# Patient Record
Sex: MALE | NOT HISPANIC OR LATINO | Employment: OTHER
[De-identification: names, ages, dates, MRNs, and addresses within clinical notes are randomized per-mention and may not be internally consistent; named-entity substitution may affect disease eponyms.]

---

## 2019-11-02 ENCOUNTER — HEALTH MAINTENANCE LETTER (OUTPATIENT)
Age: 54
End: 2019-11-02

## 2020-11-14 ENCOUNTER — HEALTH MAINTENANCE LETTER (OUTPATIENT)
Age: 55
End: 2020-11-14

## 2021-09-12 ENCOUNTER — HEALTH MAINTENANCE LETTER (OUTPATIENT)
Age: 56
End: 2021-09-12

## 2022-01-02 ENCOUNTER — HEALTH MAINTENANCE LETTER (OUTPATIENT)
Age: 57
End: 2022-01-02

## 2022-11-19 ENCOUNTER — HEALTH MAINTENANCE LETTER (OUTPATIENT)
Age: 57
End: 2022-11-19

## 2023-04-09 ENCOUNTER — HEALTH MAINTENANCE LETTER (OUTPATIENT)
Age: 58
End: 2023-04-09

## 2023-11-24 ENCOUNTER — LAB REQUISITION (OUTPATIENT)
Dept: LAB | Facility: CLINIC | Age: 58
End: 2023-11-24
Payer: COMMERCIAL

## 2023-11-24 PROCEDURE — 88342 IMHCHEM/IMCYTCHM 1ST ANTB: CPT | Mod: TC,ORL | Performed by: SPECIALIST

## 2023-12-07 ENCOUNTER — MEDICAL CORRESPONDENCE (OUTPATIENT)
Dept: HEALTH INFORMATION MANAGEMENT | Facility: CLINIC | Age: 58
End: 2023-12-07
Payer: COMMERCIAL

## 2023-12-08 ENCOUNTER — TRANSCRIBE ORDERS (OUTPATIENT)
Dept: OTHER | Age: 58
End: 2023-12-08

## 2023-12-08 DIAGNOSIS — C44.310 BASAL CELL CARCINOMA (BCC) OF SKIN OF FACE, UNSPECIFIED PART OF FACE: Primary | ICD-10-CM

## 2023-12-12 ENCOUNTER — TELEPHONE (OUTPATIENT)
Dept: DERMATOLOGY | Facility: CLINIC | Age: 58
End: 2023-12-12
Payer: COMMERCIAL

## 2023-12-12 NOTE — TELEPHONE ENCOUNTER
Called patient to schedule surgery with Dr. Joyce    Date of Surgery: 02/06    Surgery type: Mohs    Consult scheduled: Yes    Has patient had mohs with us before? No    Additional comments: norman Santoyo on 12/12/2023 at 11:09 AM

## 2023-12-16 NOTE — TELEPHONE ENCOUNTER
FUTURE VISIT INFORMATION      FUTURE VISIT INFORMATION:  Date: 2.6.24  Time: 9:30  [place] [facility] [place]ERRAL INFORMATION:  Referring provider:  Saleem  [place]erring providers clinic[place]  [facility] [place] [facility]  Reason for visit/diagnosis  right temple, shave biopsy - Basal cell carcinoma, infiltrative type.      RECORDS REQUESTED FROM:       Clinic name Comments Records Status Imaging Status   [facility] [place] [facility] [place]14.23  Path # [id number] CE Received

## 2024-01-27 ENCOUNTER — HEALTH MAINTENANCE LETTER (OUTPATIENT)
Age: 59
End: 2024-01-27

## 2024-01-30 ENCOUNTER — VIRTUAL VISIT (OUTPATIENT)
Dept: DERMATOLOGY | Facility: CLINIC | Age: 59
End: 2024-01-30
Payer: COMMERCIAL

## 2024-01-30 DIAGNOSIS — C44.319 BASAL CELL CARCINOMA (BCC) OF SKIN OF OTHER PART OF FACE: Primary | ICD-10-CM

## 2024-01-30 PROCEDURE — 99441 PR PHYSICIAN TELEPHONE EVALUATION 5-10 MIN: CPT | Mod: GC | Performed by: DERMATOLOGY

## 2024-01-30 ASSESSMENT — PAIN SCALES - GENERAL: PAINLEVEL: MODERATE PAIN (5)

## 2024-01-30 NOTE — LETTER
1/30/2024       RE: Jamal Doran  [address]  [address]  [place] [address]     Dear Colleague,    Thank you for referring your patient, Jamal Doran, to the [facility] DERMATOLOGIC SURGERY CLINIC [place] at [facility]. Please see a copy of my visit note below.    [facility] Dermatology Note  Encounter Date: Jan 30, 2024  Store-and-Forward and Telephone. Location of teledermatologist: [facility] DERMATOLOGIC SURGERY CLINIC [place].  Start time: 1358. End time: 1403.    Dermatologic Surgery Telemedicine Consult Note    Dermatology Problem List:  - iBCC, R temple, s/p shave bx 11/14/23, MMS scheduled 2/6/24 @ 930a [facility]    CC: Derm Problem (Patient is here today for a consult regarding BCC of right temple.)      Subjective: Jamal Doran is a 58 year old male who presents today for Mohs micrographic surgery consultation for a recent diagnosis of skin cancer.  - Skin cancer(s): BCC  - Location(s): R temple  - has not had mohs surgery before but has read about it online   - [place] [facility] [place]  - [place]'t [place] regularly  - no other concerns today    Objective:   Skin: Focused examination of the R temple within the teledermatology photograph(s) on 1/28/24 was performed.   - roughly 1.3 cm crusted plaque on L temple just superolateral to tail of the eyebrow    Path report:   Case: [id number]  Collected: 11/14/23    Final Diagnosis:   Skin, right temple, shave biopsy - Basal cell carcinoma, infiltrative type.     Assessment and Plan:     1. Plan for Mohs micrographic surgery for skin cancer(s) above:  *Review lab result(s): Dermpath report   - We discussed the nature of the diagnosis/condition above. We discussed the treatment options, including the risks benefits and expectations of these options. We recommend micrographic surgery as the most effective and most tissue sparing option for treatment, and the  patient agrees to proceed with this.  The patient is aware of the risks, benefits and expectations of this procedure. The patient will be scheduled for this procedure, if not already done so.  - We anticipate the following closure type: Sliding or lifting flap    The patient was discussed with and evaluated by attending physician, Mynor Joyce MD.    Staff Involved:   Scribe/Fellow/Staff    Scribe Disclosure:   I, KAZ LARSON, am serving as a scribe; to document services personally performed by Mynor Joyce MD -based on data collection and the provider's statements to me.     Janae Dobbs MD  Micrographic Surgery and Dermatologic Oncology (MSDO) Fellow, PGY-5          Attestation signed by Mynor Joyce MD at 2/6/2024  8:02 PM:  Attending Attestation  I attest that I discussed the case with the Fellow.  I agree with the plan.   I have reviewed the note and edited it as necessary.    Mynor Joyce M.D.  Professor  Director of Dermatologic Surgery  Department of Dermatology  [facility]

## 2024-01-30 NOTE — NURSING NOTE
Chief Complaint   Patient presents with    Derm Problem     Patient is here today for a consult regarding BCC of right temple.     Nissa SANCHEZ RN

## 2024-01-30 NOTE — PROGRESS NOTES
[facility] Dermatology Note  Encounter Date: Jan 30, 2024  Store-and-Forward and Telephone. Location of teledermatologist: [facility] DERMATOLOGIC SURGERY CLINIC [place].  Start time: 1358. End time: 1403.    Dermatologic Surgery Telemedicine Consult Note    Dermatology Problem List:  - iBCC, R temple, s/p shave bx 11/14/23, MMS scheduled 2/6/24 @ 930a [facility]    CC: Derm Problem (Patient is here today for a consult regarding BCC of right temple.)      Subjective: Jamal Doran is a 58 year old male who presents today for Mohs micrographic surgery consultation for a recent diagnosis of skin cancer.  - Skin cancer(s): BCC  - Location(s): R temple  - has not had mohs surgery before but has read about it online   - [place] [facility] [place]  - doesn't exercise regularly  - no other concerns today    Objective:   Skin: Focused examination of the R temple within the teledermatology photograph(s) on 1/28/24 was performed.   - roughly 1.3 cm crusted plaque on L temple just superolateral to tail of the eyebrow    Path report:   Case: [id number]  Collected: 11/14/23    Final Diagnosis:   Skin, right temple, shave biopsy - Basal cell carcinoma, infiltrative type.     Assessment and Plan:     1. Plan for Mohs micrographic surgery for skin cancer(s) above:  *Review lab result(s): Dermpath report   - We discussed the nature of the diagnosis/condition above. We discussed the treatment options, including the risks benefits and expectations of these options. We recommend micrographic surgery as the most effective and most tissue sparing option for treatment, and the patient agrees to proceed with this.  The patient is aware of the risks, benefits and expectations of this procedure. The patient will be scheduled for this procedure, if not already done so.  - We anticipate the following closure type: Sliding or lifting flap    The patient was discussed with and evaluated by attending physician, Mynor Joyce,  MD.    Staff Involved:   Scribe/Fellow/Staff    Scribe Disclosure:   I, KAZ LARSON, am serving as a scribe; to document services personally performed by Mynor Joyce MD -based on data collection and the provider's statements to me.     Janae Dobbs MD  Micrographic Surgery and Dermatologic Oncology (MSDO) Fellow, PGY-5

## 2024-02-06 ENCOUNTER — OFFICE VISIT (OUTPATIENT)
Dept: DERMATOLOGY | Facility: CLINIC | Age: 59
End: 2024-02-06
Payer: COMMERCIAL

## 2024-02-06 ENCOUNTER — PRE VISIT (OUTPATIENT)
Dept: DERMATOLOGY | Facility: CLINIC | Age: 59
End: 2024-02-06

## 2024-02-06 VITALS — SYSTOLIC BLOOD PRESSURE: 113 MMHG | DIASTOLIC BLOOD PRESSURE: 65 MMHG | HEART RATE: 96 BPM

## 2024-02-06 DIAGNOSIS — C44.310 BASAL CELL CARCINOMA (BCC) OF SKIN OF FACE, UNSPECIFIED PART OF FACE: ICD-10-CM

## 2024-02-06 PROCEDURE — 14041 TIS TRNFR F/C/C/M/N/A/G/H/F: CPT | Performed by: DERMATOLOGY

## 2024-02-06 PROCEDURE — 17311 MOHS 1 STAGE H/N/HF/G: CPT | Performed by: DERMATOLOGY

## 2024-02-06 ASSESSMENT — PAIN SCALES - GENERAL: PAINLEVEL: MODERATE PAIN (5)

## 2024-02-06 NOTE — LETTER
2/6/2024       RE: Jamal Doran  [address] [place] [address]  [place] [facility] [address]     Dear Colleague,    Thank you for referring your patient, Jamal Doran, to the [facility] DERMATOLOGIC SURGERY CLINIC [place] at [facility]. Please see a copy of my visit note below.    [facility] Dermatologic Surgery Clinic [place] Procedure Note      Date of Service:  Feb 6, 2024  Surgery: Mohs micrographic surgery    Case 1  Repair Type: Advancement Flap  Repair Size: 3.8 x 3.7 cm  Suture Material: 4-0 Monocryl / 5-0 Fast Absorbing Gut   Tumor Type: BCC - Basal Cell Carcinoma  Location: [place]  Derm-Path Accession #: [id number]   PreOp Size: 1.5 x 1.5 cm  PostOp Size: 2.5 x 2.6 cm  Mohs Accession #:   Level of Defect: Fat      Procedure:  We discussed the principles of treatment and most likely complications including scarring, bleeding, infection, swelling, pain, crusting, nerve damage, large wound,  incomplete excision, wound dehiscence,  nerve damage, recurrence, and a second procedure may be recommended to obtain the best cosmetic or functional result.    Informed consent was obtained and the patient underwent the procedure as follows:  The patient was placed supine on the operating table.  The cancer was identified, outlined with a marker, and verified by the patient.  The entire surgical field was prepped with chlorhexidine.  The surgical site was anesthetized using Lidocaine 1% with epi 1 : 100,000.    The area of clinically apparent tumor was not debulked. The layer of tissue was then surgically excised using a #15 blade and was then transferred onto a specimen sheet maintaining the orientation of the specimen. Hemostasis was obtained using monopolar electrocoagulation. The wound site was then covered with a dressing while the tissue samples were processed for examination.    The excised tissue was transported to the Mohs histology  laboratory maintaining the tissue orientation.  The tissue specimen was relaxed so that the entire surgical margin was in a single horizontal plane for sectioning and inked for precise mapping.  A precise reference map was drawn to reflect the sectioning of the specimen, colored inking of the margins, and orientation on the patient. The tissue was processed using horizontal sectioning of the base and continuous peripheral margins.  The histopathologic sections were reviewed in conjunction with the reference map.    Total blocks: 1    Total slides:  3    There were no cancer cells visualized on examination, therefore Mohs surgery was complete.    Reconstruction:  Advancement Flap    PROCEDURE:  The wound was debeveled and undermined broadly in all directions to the level of fat. Hemostasis was obtained using monopolar electrocoagulation.  The advancement flap was incised to the level of fat removing a cone of redundant tissue from temple. The flap was further undermined in all directions.    Hemostasis was again obtained.  The flap was then advanced into the defect and secured using buried dermal sutures.  Redundant areas of tissue were excised using the triangulation technique.  The flap wound edges of both the primary and secondary defects were then approximated with buried dermal sutures and the epidermis was then carefully approximated using 5-0 fast absorbing gut sutures.  The wound was cleansed with saline, and ointment was applied along the wound surface.  A sterile pressure dressing of non-adherent gauze was applied and wound care instructions were given verbally and in writing. The patient left the operating suite in stable condition.  Anticipate Derma-Abrasion to be used as a second stage of this reconstruction.     Repair Size:   3.8 x 3.7 cm  Sutures Used:  4-0 Monocryl / 5-0 Fast Absorbing Gut       The attending physician was present for the entire procedure and always immediately available.    Staff  Involved:   Scribe/Staff    Scribe Disclosure:   I, KAZ NEO, am serving as a scribe; to document services personally performed by Mynor Joyce MD -based on data collection and the provider's statements to me.     Attending attestation:  I personally performed the entire procedure.  I have reviewed the note and edited it as necessary, and agree with its contents.    Mynor Joyce M.D.  Professor  Director of Dermatologic Surgery  Department of Dermatology  [facility]    Dermatology Surgery Clinic  [place] [id number]

## 2024-02-06 NOTE — PROGRESS NOTES
[facility] Dermatologic Surgery Clinic [place] Procedure Note      Date of Service:  Feb 6, 2024  Surgery: Mohs micrographic surgery    Case 1  Repair Type: Advancement Flap  Repair Size: 3.8 x 3.7 cm  Suture Material: 4-0 Monocryl / 5-0 Fast Absorbing Gut   Tumor Type: BCC - Basal Cell Carcinoma  Location: [facility] [place]  Derm-Path Accession #: [id number]   PreOp Size: 1.5 x 1.5 cm  PostOp Size: 2.5 x 2.6 cm  Mohs Accession #:   Level of Defect: Fat      Procedure:  We discussed the principles of treatment and most likely complications including scarring, bleeding, infection, swelling, pain, crusting, nerve damage, large wound,  incomplete excision, wound dehiscence,  nerve damage, recurrence, and a second procedure may be recommended to obtain the best cosmetic or functional result.    Informed consent was obtained and the patient underwent the procedure as follows:  The patient was placed supine on the operating table.  The cancer was identified, outlined with a marker, and verified by the patient.  The entire surgical field was prepped with chlorhexidine.  The surgical site was anesthetized using Lidocaine 1% with epi 1 : 100,000.    The area of clinically apparent tumor was not debulked. The layer of tissue was then surgically excised using a #15 blade and was then transferred onto a specimen sheet maintaining the orientation of the specimen. Hemostasis was obtained using monopolar electrocoagulation. The wound site was then covered with a dressing while the tissue samples were processed for examination.    The excised tissue was transported to the Mohs histology laboratory maintaining the tissue orientation.  The tissue specimen was relaxed so that the entire surgical margin was in a single horizontal plane for sectioning and inked for precise mapping.  A precise reference map was drawn to reflect the sectioning of the specimen, colored inking of the margins, and orientation on the patient. The  tissue was processed using horizontal sectioning of the base and continuous peripheral margins.  The histopathologic sections were reviewed in conjunction with the reference map.    Total blocks: 1    Total slides:  3    There were no cancer cells visualized on examination, therefore Mohs surgery was complete.    Reconstruction:  Advancement Flap    PROCEDURE:  The wound was debeveled and undermined broadly in all directions to the level of fat. Hemostasis was obtained using monopolar electrocoagulation.  The advancement flap was incised to the level of fat removing a cone of redundant tissue from temple. The flap was further undermined in all directions.    Hemostasis was again obtained.  The flap was then advanced into the defect and secured using buried dermal sutures.  Redundant areas of tissue were excised using the triangulation technique.  The flap wound edges of both the primary and secondary defects were then approximated with buried dermal sutures and the epidermis was then carefully approximated using 5-0 fast absorbing gut sutures.  The wound was cleansed with saline, and ointment was applied along the wound surface.  A sterile pressure dressing of non-adherent gauze was applied and wound care instructions were given verbally and in writing. The patient left the operating suite in stable condition.  Anticipate Derma-Abrasion to be used as a second stage of this reconstruction.     Repair Size:   3.8 x 3.7 cm  Sutures Used:  4-0 Monocryl / 5-0 Fast Absorbing Gut       The attending physician was present for the entire procedure and always immediately available.    Staff Involved:   Scribe/Staff    Scribe Disclosure:   I, KAZ LARSON, am serving as a scribe; to document services personally performed by Mynor Joyce MD -based on data collection and the provider's statements to me.     Attending attestation:  I personally performed the entire procedure.  I have reviewed the note and edited it as necessary,  and agree with its contents.    Mynor Joyce M.D.  Professor  Director of Dermatologic Surgery  Department of Dermatology  [facility]    Dermatology Surgery Clinic  [facility] [place] [id number]

## 2024-02-06 NOTE — PATIENT INSTRUCTIONS
Wound care    I will experience scar, bleeding, swelling, pain, crusting and redness. I may experience incomplete removal or recurrence. Risks are bleeding, bruising, swelling, infection, nerve damage, & a large wound. A second procedure may be recommended to obtain the best cosmetic or functional result.       A three month office visit with your Surgeon is recommended for scar evaluation. Please reach out sooner if you have concerns about you surgical site/wound.    Caring for your skin after surgery    After your surgery, a pressure bandage will be placed over the area that has stitches. This is important to prevent bleeding. Please follow these instructions over the next 1 to 2 weeks. Following this regimen will help to prevent complications as your wound heals.     For the first 48 hours after your surgery:    Leave the pressure dressing on and keep it dry. If it should come loose, you may re-tape it, but do not take it off.  Relax and take it easy. Do not do any vigorous exercise or heavy lifting. This could cause the wound to bleed.  Post-Operative pain is usually mild. If you are able to take tylenol, You may take plain or extra-strength Tylenol (acetaminophen) As directed on the bottle (do not take more than 4,000mg in one day). If you are able to take ibuprofen, you can alternate the tylenol and ibuprofen.   Avoid alcohol as this may increase your tendency to bleed.   You may put an ice pack around the bandaged area for 20 minutes at a time as needed. This may help reduce swelling, bruising, and pain. Make sure the ice pack is waterproof so that the pressure bandage doesn t get wet.  If the wound is on the face try to sleep with your head elevated. Either in a recliner or propped up in bed, this will decrease swelling around the eyes.   You may see a small amount of drainage or blood on your pressure bandage. This is normal. However:  If drainage or bleeding continues or saturates the bandage, you will  "need to apply firm pressure over the bandage with a piece of gauze for 15 minutes.  If bleeding continues after applying pressure for 15 minutes, apply an ice pack to the bandaged area for 15 minutes.  If bleeding still continues, call our office or go to the nearest emergency room.    Remove pressure dressing 48 hours after surgery:    Carefully remove the pressure bandage. If it seems sticky or too difficult to get off, you may need to soak it off in the shower.  After the pressure dressing is removed, you may shower and get the wound wet. However, Do Not let the forceful stream of the shower hit the wound directly.  Follow these wound care and dressing change instructions:   In the shower wash the surgical site last with its own separate wash cloth.  You may allow water to run over the site. Take a clean wash cloth wet with soapy warm water and gently pat the suture site to help remove any crust or drainage.   Do Not rub or scrub the site    After site is clean pat dry and apply a thin layer of Vaseline ointment  over the suture site with a cotton swab or clean finger.   Cover the suture site with Telfa (non-stick) dressing. You may tape a piece of gauze over the Telfa for extra protection if you wish.  Continue wound care at least once a day, twice if you are active or around a contaminated environment.  Continue daily wound care until your surgical site is completely healed.   Dissolving stitches, if you have been told your stitches are dissolving they should dissolve in one to one and a half week.      If you are able to take acetaminophen (\"Tylenol,\" etc.) and ibuprofen (\"Advil\" or \"Motrin,\" etc.), then you may STAGGER these medications by taking 400 mg of ibuprofen (usually two tabs) every 8 hours and 1,000 mg of acetaminophen (e.g., two tabs of extra-strength Tylenol) every 8 hours.    This means, for example, that you could take the followin,000 mg of Tylenol, followed 4 hours later by 400 mg " Ibuprofen, followed 4 hours later with 1,000 mg of Tylenol, followed 4 hours later by 400 mg Ibuprofen, followed 4 hours later with 1,000 mg of Tylenol, and so forth.     Essentially, you can take either 1,000 mg of Tylenol or 400 mg of ibuprofen in alternating fashion EVERY FOUR HOURS.    Do NOT exceed more than 4,000 mg of Tylenol or 3,200 mg of ibuprofen per 24 hours. If you are not able to take Tylenol or ibuprofen as above due to other health issues (or a physician has told you directly that you are not allowed to take one of them, say due to pre-existing severe liver or kidney issues), then disregard the above directions.    Scientific evidence supports that this combination/schedule of pain medications is just as effective, if not more effective, than taking a narcotic pain medicine.       Follow up will be a 3 month scar evaluation either in person or via a telephone visit with you sending in a photo via [email or web address]. Unless you have been told to follow up sooner or if you have concerns and would like to be see sooner. Please call or send us in a [email or web address] message if possible and attach a photo.        What to expect:    The first couple of days your wound may be tender and may bleed slightly when doing wound care.  There may be swelling and bruising around the wound, especially if it is near the eyes. For your comfort, you may apply ice or cold compresses to the bruises after your have removed the pressure bandage.  The area around your wound may be numb for several weeks or even months.  You may experience periodic sharp pain or mild itching around the wound as it heals.   The suture line will look dark for a while but will lighten over time.       When to call us:    You have bleeding that will not stop after applying pressure and ice.  You have pain that is not controlled with Tylenol (acetaminophen.)  You have signs or symptoms of an infection such as:  Fever over 100 degrees Fahrenheit  Redness, warmth or  foul-smelling drainage from the wound  If you have any questions, or are not sure how to take care of the wound.    Phone numbers:    During business hours (M-F 8:00-4:30 p.m.)    [facility]- [phone]    Option 1 appt. Ask the call representative for the Dermatology Surgery Team.     For Dermatology Surgery scheduling please call Diana at [phone]    ---------------------------------------------------------  Evenings/Weekends/Holidays  Hospital - [phone]   TTY for hearing lmbuyklo-[phone]  *Ask  to page dermatologist on-call  Emergency Gato-[phone]  TTY for hearing impaired- [phone]

## 2024-02-07 ENCOUNTER — MYC MEDICAL ADVICE (OUTPATIENT)
Dept: DERMATOLOGY | Facility: CLINIC | Age: 59
End: 2024-02-07
Payer: COMMERCIAL

## 2024-02-07 DIAGNOSIS — G89.18 POST-OPERATIVE PAIN: Primary | ICD-10-CM

## 2024-02-07 RX ORDER — OXYCODONE AND ACETAMINOPHEN 5; 325 MG/1; MG/1
1 TABLET ORAL EVERY 6 HOURS PRN
Qty: 12 TABLET | Refills: 0 | Status: SHIPPED | OUTPATIENT
Start: 2024-02-07 | End: 2024-02-10

## 2024-05-07 ENCOUNTER — OFFICE VISIT (OUTPATIENT)
Dept: DERMATOLOGY | Facility: CLINIC | Age: 59
End: 2024-05-07
Payer: COMMERCIAL

## 2024-05-07 DIAGNOSIS — L90.5 SCAR: Primary | ICD-10-CM

## 2024-05-07 DIAGNOSIS — Z85.828 HISTORY OF NONMELANOMA SKIN CANCER: ICD-10-CM

## 2024-05-07 PROCEDURE — 99024 POSTOP FOLLOW-UP VISIT: CPT | Performed by: DERMATOLOGY

## 2024-05-07 ASSESSMENT — PAIN SCALES - GENERAL: PAINLEVEL: NO PAIN (0)

## 2024-05-07 NOTE — LETTER
5/7/2024       RE: Jamal Dorna  [address]  [place] [address]     Dear Colleague,    Thank you for referring your patient, Jamal Doran, to the [facility] DERMATOLOGIC SURGERY CLINIC [place] at [facility]. Please see a copy of my visit note below.    Dermatologic Surgery Post-Op Scar Check     CC: No chief complaint on file.      Dermatology Problem List:  iBCC, R temple, s/p shave bx 11/14/23, s/p MMS 2/6/24    Subjective: Jamal Doran is a 58 year old male who presents today for scar evaluation after MMS with advancement flap reconstruction was performed for BCC of the right temple on 2/6/24. He was experiencing increased pain on 2/7/24 and was rx pain medication.  - no complaints  - no other concerns today    Objective: An exam of the right temple was performed today   - well-healed scar on the R forehead     Assessment and Plan:     1. iBCC, R temple, s/p shave bx 11/14/23, s/p MMS 2/6/24  - Surgical site healed well.  - The patient should follow up with dermatologic surgery PRN, as well as continue with regular skin exams in general dermatology clinic.    Patient was discussed with and evaluated by attending physician Dr. Mynor Joyce.    Staff Involved:   Scribe/Fellow/Staff    Scribe Disclosure:   I, KAZ LARSON, am serving as a scribe; to document services personally performed by Mynor Joyce MD -based on data collection and the provider's statements to me.     Attending Attestation  I attest that the Scribe recorded the interview and exam that I personally performed.  I have reviewed the note and edited it as necessary.    Mynor Joyce M.D.  Professor  Director of Dermatologic Surgery  Department of Dermatology  [facility]

## 2024-05-07 NOTE — NURSING NOTE
Chief Complaint   Patient presents with    Scar Management     3 month scar eval face     Rashmi WALL CMA

## 2025-02-01 ENCOUNTER — HEALTH MAINTENANCE LETTER (OUTPATIENT)
Age: 60
End: 2025-02-01